# Patient Record
Sex: MALE | Race: BLACK OR AFRICAN AMERICAN | NOT HISPANIC OR LATINO | Employment: FULL TIME | URBAN - METROPOLITAN AREA
[De-identification: names, ages, dates, MRNs, and addresses within clinical notes are randomized per-mention and may not be internally consistent; named-entity substitution may affect disease eponyms.]

---

## 2021-05-12 ENCOUNTER — APPOINTMENT (OUTPATIENT)
Dept: GENERAL RADIOLOGY | Facility: CLINIC | Age: 31
End: 2021-05-12
Attending: EMERGENCY MEDICINE

## 2021-05-12 ENCOUNTER — HOSPITAL ENCOUNTER (EMERGENCY)
Facility: CLINIC | Age: 31
Discharge: HOME | End: 2021-05-12
Attending: EMERGENCY MEDICINE | Admitting: EMERGENCY MEDICINE

## 2021-05-12 VITALS
OXYGEN SATURATION: 98 % | HEART RATE: 82 BPM | SYSTOLIC BLOOD PRESSURE: 138 MMHG | DIASTOLIC BLOOD PRESSURE: 67 MMHG | TEMPERATURE: 97.9 F | RESPIRATION RATE: 16 BRPM

## 2021-05-12 DIAGNOSIS — S93.492A HIGH ANKLE SPRAIN, LEFT, INITIAL ENCOUNTER: ICD-10-CM

## 2021-05-12 DIAGNOSIS — M25.572 ACUTE LEFT ANKLE PAIN: ICD-10-CM

## 2021-05-12 PROCEDURE — 250N000013 HC RX MED GY IP 250 OP 250 PS 637: Performed by: EMERGENCY MEDICINE

## 2021-05-12 PROCEDURE — 73630 X-RAY EXAM OF FOOT: CPT | Mod: LT

## 2021-05-12 PROCEDURE — 99284 EMERGENCY DEPT VISIT MOD MDM: CPT | Performed by: EMERGENCY MEDICINE

## 2021-05-12 PROCEDURE — 73610 X-RAY EXAM OF ANKLE: CPT | Mod: LT

## 2021-05-12 PROCEDURE — 99284 EMERGENCY DEPT VISIT MOD MDM: CPT

## 2021-05-12 PROCEDURE — 73590 X-RAY EXAM OF LOWER LEG: CPT | Mod: LT

## 2021-05-12 RX ORDER — NAPROXEN 500 MG/1
500 TABLET ORAL 2 TIMES DAILY PRN
Qty: 30 TABLET | Refills: 0 | Status: SHIPPED | OUTPATIENT
Start: 2021-05-12

## 2021-05-12 RX ORDER — OXYCODONE HYDROCHLORIDE 5 MG/1
5 TABLET ORAL ONCE
Status: COMPLETED | OUTPATIENT
Start: 2021-05-12 | End: 2021-05-12

## 2021-05-12 RX ADMIN — OXYCODONE HYDROCHLORIDE 5 MG: 5 TABLET ORAL at 14:50

## 2021-05-12 NOTE — LETTER
May 12, 2021      To Whom It May Concern:      Rafita Fong was seen in our Emergency Department today, 05/12/21. He may return to work/school with restrictions: use of crutches, no weight bearing on left foot, ability to sit. Restricted for 2 weeks. Restrictions for any longer should come after re-evaluation in clinic.     Sincerely,        Jasmeet Huerta MD  Emergency Medicine

## 2021-05-12 NOTE — DISCHARGE INSTRUCTIONS
Instructions from your doctor today:  Emergency Department testing is focused on the potential causes of your symptoms that are the most dangerous possibilities, and cannot cover every possibility. Based on the evaluation, it was deemed sufficiently safe to discharge and continue management through the clinics. Thus, follow-up is very important to assess for improvement/worsening, potential further testing, and potential treatment adjustments.     Continue use of crutches with only toe-touch weight bearing. Use naproxen for pain and RICE protocol to limit inflammation/swelling.     Please make an appointment to follow up with:  - Sports Medicine (phone: [phone]) in about 7 days  - If you do not have a primary care provider, you can be seen in follow-up and establish care by calling any of the clinics below:     - [facility] (phone: [phone])     - Primary Care / [facility] (phone: [phone])   - Have your provider review the results from today's visit with you again, including any potential follow-up or additional testing that may be needed based on the results. Occasionally, incidental findings are found on later review by radiologists that may need follow-up.     Return to the Emergency Department immediately if you have worsening symptoms, or any other urgent or potentially life-threatening concerns.

## 2021-05-12 NOTE — ED PROVIDER NOTES
History     Chief Complaint   Patient presents with     Ankle Pain     two days ago fell riding skateboard, heard and felt a pop, has pain to left lower leg down to foot     HPI  Rafita Fong is a 30 year old male who presents to the ED with left ankle pain.  Patient reports that 2 days ago he was skateboarding and rolled his ankle.  It turned inward on a small ramp.  He has had significant pain in the area since then.  He has been using a medical boot that a friend gave him, along with crutches.  Ankle has become progressively more swollen over the past day or so.  Pain is localized in the ankle, distal lower leg and foot.  Pain is aching, sharp quality.     I have reviewed the Past Medical History with the patient, pertinent items: prior left ankle sprains    Review of Systems  No recent fevers, no chest pain, no abdominal pain    Physical Exam   BP: 138/75  Pulse: 104  Temp: 97.9  F (36.6  C)  Resp: 16  SpO2: 99 %    Physical Exam  General: No acute distress. Appears stated age.   HENT: MMM, no oropharyngeal lesions  Eyes: PERRL, normal sclerae   Cardio: Regular rate, extremities well perfused  Resp: Normal work of breathing, normal respiratory rate  Neuro: alert and fully oriented. CN II-XII grossly intact. Grossly normal strength and sensation in all extremities.   MSK: left lower leg: No proximal tibia/fibula tenderness, there is tenderness at the mid fibula proceeding distally.  Left ankle is very swollen and tender.  There is tenderness over the fourth and fifth metacarpals and over the talus. Otherwise, no deformities. Grossly normal ROM in all other joints.   Integumentary/Skin: no rash, normal color  Psych: normal affect, normal behavior    ED Course      Procedures        Critical Care time:  none     Labs Ordered and Resulted from Time of ED Arrival Up to the Time of Departure from the ED - No data to display  XR Ankle Left G/E 3 Views   Final Result   IMPRESSION: Left ankle lateral soft tissue  swelling. Ligament injury   is suspected. Possible mild prominence of the medial ankle mortise   raising the possibility of syndesmosis injury. The fibula appears   intact. No evidence of ankle fracture. The left foot and leg appear   within normal limits.      DANETTE JOSÉ MD      Foot XR, G/E 3 views, left   Final Result   IMPRESSION: Left ankle lateral soft tissue swelling. Ligament injury   is suspected. Possible mild prominence of the medial ankle mortise   raising the possibility of syndesmosis injury. The fibula appears   intact. No evidence of ankle fracture. The left foot and leg appear   within normal limits.      DANETTE JOSÉ MD      XR Tibia & Fibula Left 2 Views   Final Result   IMPRESSION: Left ankle lateral soft tissue swelling. Ligament injury   is suspected. Possible mild prominence of the medial ankle mortise   raising the possibility of syndesmosis injury. The fibula appears   intact. No evidence of ankle fracture. The left foot and leg appear   within normal limits.      DANETTE JOSÉ MD             Assessments & Plan (with Medical Decision Making)   Patient presenting with left ankle pain since rolling his ankle 2 days ago while skateboarding. Vitals in the ED unremarkable. Exam with a large amount of swelling surrounding the left ankle.  Nursing notes reviewed.     X-ray of the tibia/fibula, ankle, foot demonstrate no evidence of fracture, did show swelling c/w sprain. Mild prominence of the medial mortise suggestive of possible syndesmosis injury.     In the ED, the patient's symptoms were management with oxycodone with improvement upon reassessment.     The complete clinical picture is most consistent with ankle sprain. After counseling on the diagnosis, work-up, and treatment plan, the patient was discharged. ACE wrap applied, recommended continued non-weightbearing status with crutches until follow-up. Work note given. The patient was advised to follow-up with sports med in a week. The  patient was advised to return to the ED if worsening symptoms, or if there are any urgent/life-threatening concerns.     This part of the medical record was transcribed by Josefa Grossman, Medical Scribe, from a dictation done by Jasmeet Huerta MD.   Final diagnoses:   High ankle sprain, left, initial encounter   Acute left ankle pain     Discharge Medication List as of 5/12/2021  4:02 PM      START taking these medications    Details   naproxen (NAPROSYN) 500 MG tablet Take 1 tablet (500 mg) by mouth 2 times daily as needed for moderate pain Take with meals., Disp-30 tablet, R-0, E-Prescribe             --  Jasmeet Huerta MD   Emergency Medicine   [facility] EMERGENCY DEPARTMENT  5/12/2021     Jasmeet Huerta MD  05/12/21 1623

## 2021-05-22 ENCOUNTER — HOSPITAL ENCOUNTER (EMERGENCY)
Facility: CLINIC | Age: 31
Discharge: HOME | End: 2021-05-23
Attending: STUDENT IN AN ORGANIZED HEALTH CARE EDUCATION/TRAINING PROGRAM | Admitting: STUDENT IN AN ORGANIZED HEALTH CARE EDUCATION/TRAINING PROGRAM

## 2021-05-22 DIAGNOSIS — L50.0 ALLERGIC URTICARIA: ICD-10-CM

## 2021-05-22 DIAGNOSIS — T39.315A: ICD-10-CM

## 2021-05-22 DIAGNOSIS — T78.40XA ALLERGIC REACTION, INITIAL ENCOUNTER: ICD-10-CM

## 2021-05-22 PROCEDURE — 99284 EMERGENCY DEPT VISIT MOD MDM: CPT | Performed by: STUDENT IN AN ORGANIZED HEALTH CARE EDUCATION/TRAINING PROGRAM

## 2021-05-22 PROCEDURE — 99283 EMERGENCY DEPT VISIT LOW MDM: CPT

## 2021-05-22 PROCEDURE — 250N000013 HC RX MED GY IP 250 OP 250 PS 637: Performed by: STUDENT IN AN ORGANIZED HEALTH CARE EDUCATION/TRAINING PROGRAM

## 2021-05-22 RX ORDER — DIPHENHYDRAMINE HCL 50 MG
50 CAPSULE ORAL ONCE
Status: COMPLETED | OUTPATIENT
Start: 2021-05-22 | End: 2021-05-22

## 2021-05-22 RX ADMIN — DIPHENHYDRAMINE HYDROCHLORIDE 50 MG: 50 CAPSULE ORAL at 23:47

## 2021-05-22 ASSESSMENT — ENCOUNTER SYMPTOMS: ALLERGIC REACTION: 1

## 2021-05-23 VITALS
SYSTOLIC BLOOD PRESSURE: 140 MMHG | WEIGHT: 201 LBS | TEMPERATURE: 99.2 F | HEART RATE: 75 BPM | OXYGEN SATURATION: 100 % | DIASTOLIC BLOOD PRESSURE: 65 MMHG | RESPIRATION RATE: 16 BRPM

## 2021-05-23 PROCEDURE — 250N000013 HC RX MED GY IP 250 OP 250 PS 637: Performed by: STUDENT IN AN ORGANIZED HEALTH CARE EDUCATION/TRAINING PROGRAM

## 2021-05-23 PROCEDURE — 250N000012 HC RX MED GY IP 250 OP 636 PS 637: Performed by: STUDENT IN AN ORGANIZED HEALTH CARE EDUCATION/TRAINING PROGRAM

## 2021-05-23 RX ORDER — PREDNISONE 20 MG/1
40 TABLET ORAL ONCE
Status: COMPLETED | OUTPATIENT
Start: 2021-05-23 | End: 2021-05-23

## 2021-05-23 RX ORDER — PREDNISONE 20 MG/1
TABLET ORAL
Qty: 10 TABLET | Refills: 0 | Status: SHIPPED | OUTPATIENT
Start: 2021-05-23

## 2021-05-23 RX ORDER — FAMOTIDINE 20 MG/1
20 TABLET, FILM COATED ORAL 2 TIMES DAILY
Qty: 14 TABLET | Refills: 0 | Status: SHIPPED | OUTPATIENT
Start: 2021-05-23 | End: 2021-05-30

## 2021-05-23 RX ORDER — FAMOTIDINE 20 MG/1
20 TABLET, FILM COATED ORAL ONCE
Status: COMPLETED | OUTPATIENT
Start: 2021-05-23 | End: 2021-05-23

## 2021-05-23 RX ADMIN — FAMOTIDINE 20 MG: 20 TABLET, FILM COATED ORAL at 00:58

## 2021-05-23 RX ADMIN — PREDNISONE 40 MG: 20 TABLET ORAL at 00:58

## 2021-05-23 NOTE — ED TRIAGE NOTES
Pt recently had a prescription for Naproxen after having a injury. Pt had not used Naproxen before today, first took it yesterday.  Pt has significant swelling around mouth and RUE.

## 2021-05-23 NOTE — DISCHARGE INSTRUCTIONS
You were seen for an allergic reaction.  As we spoke about take the steroids, Benadryl and famotidine as prescribed and your symptoms should start getting better in the next 1 to 2 days.  If you have any worsening symptoms including difficulty breathing or feeling that her throat is closing up come back to the emergency department.

## 2021-05-23 NOTE — ED PROVIDER NOTES
"    [facility] EMERGENCY DEPARTMENT ([facility])         5/22/21  History     Chief Complaint   Patient presents with     Allergic Reaction     Pt has swelling around mouth, airway is not compromised.  Pt also has swelling on RUE, lower forearm.     The history is provided by the patient and medical records.   Allergic Reaction    Rafita Fong is a 30 year old male with a past medical history significant for none who presents to the Emergency Department for evaluation of a suspected allergic reaction. Patient reports he was recently seen here at the ED with left ankle pain.  Patient was ultimately discharged with naproxen 500 mg as needed for pain.  Patient reports that she had recently returned back to work today, and states that this was his first day back.  He reports he took the naproxen yesterday, and has since noticed hives, and erythema over the left forearm, back of the left arm, and right medial thigh.  Patient reports that he had first noticed these signs at approximately 12 noon, and states that the redness has been spreading.  Patient reports that he has not used naproxen in the past, denies any known allergy to this medication.  Patient also reports he uses a supplement called \"kratom tea\" which has helped with his ankle pain.  Patient denies any shortness of breath, or difficulty breathing.    I have reviewed the Medications, Allergies, Past Medical and Surgical History, and Social History in the [facility] system.  PAST MEDICAL HISTORY: History reviewed. No pertinent past medical history.    PAST SURGICAL HISTORY: History reviewed. No pertinent surgical history.    Past medical history, past surgical history, medications, and allergies were reviewed with the patient. Additional pertinent items: None    FAMILY HISTORY: History reviewed. No pertinent family history.    SOCIAL HISTORY:   Social History     Tobacco Use     Smoking status: Current Every Day Smoker     Packs/day: 0.25     Smokeless tobacco: " Never Used   Substance Use Topics     Alcohol use: Not Currently     Comment: weekends     Social history was reviewed with the patient. Additional pertinent items: None      Discharge Medication List as of 5/23/2021 12:55 AM      START taking these medications    Details   diphenhydrAMINE (BENADRYL) 50 MG tablet Take 1 tablet (50 mg) by mouth every 6 hours as needed (Itching and hives), Disp-30 tablet, R-0, E-Prescribe      famotidine (PEPCID) 20 MG tablet Take 1 tablet (20 mg) by mouth 2 times daily for 7 days, Disp-14 tablet, R-0, E-Prescribe      predniSONE (DELTASONE) 20 MG tablet Take two tablets (= 40mg) each day for 5 (five) days, Disp-10 tablet, R-0, E-Prescribe         CONTINUE these medications which have NOT CHANGED    Details   naproxen (NAPROSYN) 500 MG tablet Take 1 tablet (500 mg) by mouth 2 times daily as needed for moderate pain Take with meals., Disp-30 tablet, R-0, E-Prescribe                Allergies   Allergen Reactions     Naproxen Swelling        Review of Systems  A complete review of systems was performed with pertinent positives and negatives noted in the HPI, and all other systems negative    Physical Exam   BP: (!) 141/95  Pulse: 89  Temp: 99.2  F (37.3  C)  Resp: 16  Weight: 91.2 kg (201 lb)  SpO2: 96 %      Physical Exam  Vitals signs and nursing note reviewed.       General: no acute distress. Appears stated age.   HENT: MMM, no oropharyngeal lesions. Left lateral upper and lower lip swelling. NO tongue swelling.  Eyes: PERRL, normal sclerae  Neck: non-tender, supple  Cardio: reg rate. Regular rhythm. Extremities well perfused  Resp: Normal work of breathing, normal respiratory rate.  Chest/Back: no visual signs of trauma, no CVA tenderness  Abdomen: no tenderness, non-distended, no rebound, no guarding  Neuro: alert and fully oriented. CN II-XII grossly intact. Grossly normal strength and sensation in all extremities.   MSK: no deformities. Grossly normal ROM in extremities.    Integumentary/Skin: no rash visualized, normal color  Psych: normal affect, normal behavior    ED Course   11:32 PM  The patient was seen and examined by Bakari Quiñonez MD in Room ED03.               No results found for this or any previous visit (from the past 24 hour(s)).  Medications   diphenhydrAMINE (BENADRYL) capsule 50 mg (50 mg Oral Given 5/22/21 2327)   predniSONE (DELTASONE) tablet 40 mg (40 mg Oral Given 5/23/21 0058)   famotidine (PEPCID) tablet 20 mg (20 mg Oral Given 5/23/21 0058)             Assessments & Plan (with Medical Decision Making)   This is an otherwise healthy 30-year-old male with allergic reaction likely to naproxen.  He has urticarial hives as well as some mild upper and lower lip swelling on the left side.  He was given Benadryl, prednisone and famotidine with some improvement in his symptoms.  No indication at this time for epinephrine.  He does not have anaphylaxis at this time given that he does not have more than 1 organ system and does not have any signs of airway compromise.  I spoke to him at length regarding the benefits and risks of giving epinephrine here at this time and we did shared decision-making decided not to do epinephrine.  He will take a prescription for the above medications and start taking them tomorrow morning.  Counseled him on close return precautions if he has any worsening of his airway.      I have reviewed the nursing notes.    I have reviewed the findings, diagnosis, plan and need for follow up with the patient.    Discharge Medication List as of 5/23/2021 12:55 AM      START taking these medications    Details   diphenhydrAMINE (BENADRYL) 50 MG tablet Take 1 tablet (50 mg) by mouth every 6 hours as needed (Itching and hives), Disp-30 tablet, R-0, E-Prescribe      famotidine (PEPCID) 20 MG tablet Take 1 tablet (20 mg) by mouth 2 times daily for 7 days, Disp-14 tablet, R-0, E-Prescribe      predniSONE (DELTASONE) 20 MG tablet Take two tablets (= 40mg) each  day for 5 (five) days, Disp-10 tablet, R-0, E-Prescribe           Impression:  Final diagnoses:   Allergic reaction, initial encounter   I, Stepan Ross, am serving as a trained medical scribe to document services personally performed by Bakari Quiñonez MD, based on the provider's statements to me.      IBakari MD, was physically present and have reviewed and verified the accuracy of this note documented by Stepan Ross.     5/22/2021   [facility] EMERGENCY DEPARTMENT     Bakari Quiñonez MD  05/23/21 0106